# Patient Record
(demographics unavailable — no encounter records)

---

## 2024-10-07 NOTE — PHYSICAL EXAM
[de-identified] : slight discomfort to palpation of right TMJ with opening [Midline] : trachea located in midline position [de-identified] : lkeft sided malocclusion [Normal] : no neck adenopathy

## 2024-10-07 NOTE — ASSESSMENT
[FreeTextEntry1] : I have recommended TMJ Precautions.  Consider soft diet, warm packs, massage and OTC NSAIDs if not contraindicated for TMJ pain.  Seek Dental/OMFS/TMJ Specialist consultation for continued problem.  Seek attention for otalgia, otorrhea, bleeding, headache, hearing loss, dizziness or vertigo.  F/u PRN

## 2025-01-13 NOTE — ADDENDUM
[FreeTextEntry1] : This note was written by Yanni Colunga on 01/13/2025 acting as medical scribe for Dr. Katelin Moses. I, Dr. Katelin Moses, have read and attest that all the information, medical decision making and discharge instructions within are true and accurate.

## 2025-01-13 NOTE — HEALTH RISK ASSESSMENT
[0] : 2) Feeling down, depressed, or hopeless: Not at all (0) [PHQ-2 Negative - No further assessment needed] : PHQ-2 Negative - No further assessment needed [Never] : Never [[id number]] : 0

## 2025-01-13 NOTE — HISTORY OF PRESENT ILLNESS
[Post-hospitalization from ___ Hospital] : Post-hospitalization from [unfilled] Hospital [Admitted on: ___] : The patient was admitted on [unfilled] [Discharged on ___] : discharged on [unfilled] [Discharge Summary] : discharge summary [Pertinent Labs] : pertinent labs [Radiology Findings] : radiology findings [Discharge Med List] : discharge medication list [Med Reconciliation] : medication reconciliation has been completed [FreeTextEntry2] : Ms. ARANDA is a 38 year old F with PMHx of anxiety, asthma, PCOS presenting for hospital f/u. Per pt, she had an abscess on her ovaries and fallopian tubes and was started on IV antibiotics and had a blot clot in her pelvis. Pt was started on lovenox and was told to take 3 advils and alternate with tylenol. Pt is still on metronidazole. Pt is requesting a heme referral since she did not like the doctor in the hospital. Denies chest pain, sob, sharma, dizziness, diaphoresis, palpitations, LE swelling, orthopnea, syncope, n/v, headache.

## 2025-01-15 NOTE — REASON FOR VISIT
[FreeTextEntry2] : gonadal vein thrombus [FreeTextEntry1] : 1/16/2025  39 y/o F pleasant patient who presents for evaluation. 6 days in the hospital had pelvic pains, was told there is an abcess in Fallopian tubes and ovaries, both sides.  Given IV antibiotics, 6 days in the hospital.  Now on Flagyl.  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  Was given heparin gtt, and then transitioned to lovenox 90 BID.  No family history of DVT  No personal history of DVT SHe is on Depot provera - that was started 4 days ago.  She was discharged Sat.  Prior to hopsital stay, in good health

## 2025-01-15 NOTE — ASSESSMENT
[FreeTextEntry1] : Assessment: 1.  R gonadal vein thrombus 2.  GYN abcess  Plan 1.  To see GYN for followup of infection/ abcess 2.  For now, continue lovenox as planned.  She will see hematology for followup next week.  Hopefully she can be transitioned to eliquis or xarelto at that time.  Her APLS titers were negative. 3.  RTC in 8 weeks 4.  Plan to repeat CT venogram a/p in 3 months.  If everything resolved, then will take off or reduce dose of the a/c  aunt joined by phone who is a pediatric nurse mother was present during the visit we reviewed her CT scans and labs together.

## 2025-01-24 NOTE — HISTORY OF PRESENT ILLNESS
[FreeTextEntry1] : 38 year old presents for a follow up. Pt presented to the ED on 1/5 with abdominal pain and fever. Pt admitted on 1/5 for inpatient antibiotics for bilateral tubo-ovarian abscesses. Today, she reports occasional sharp lower abdominal pain. Pt has completed antibiotics. She began depo provera (1/11/25).   On 1/5, pt had pelvic tenderness and CMT with physiologic discharge. CTAP (1/5) demonstrated complex cystic adnexal lesions bilaterally possibly TOA or pyosalpinx. TVUS (1/5) demonstrated bilateral hydrosalpinx, left cystic mass 5.9x4.2x4cm and R structure 5.6x5.4x5.3 and adjacent L adnexal mass 3.4x2.7x2.9. Patient was started on IV ceftriaxone, IV doxycycline, and IV flagyl (on 1/5).    On HD#1-2, Interventional radiology consulted for possible drainage of abscesses however recommended no acute intervention, CTM on IV abx and holding NSAIDs/AC. Patient also noted to have elevated LFTs for which GI was consulted. Tylenol held.  On HD#3, IR re-reviewed the case and deemed the collections to be too small for drainage. Pt started on DVT chemoprophylaxis and NSAIDs for pain control. On HD#4, patient still with intermittent fevers, prompting additional fever workup with LE dopplers (negative for DVT) and CT abdomen and pelvis that was ordered, not yet performed. On HD#5, CTAP revealed a gonadal vein thrombosis for which a Heparin infusion was started. Hematology consulted for further anticoagulation recs. IV antibiotics for TOA continued. On HD#6, Heparin drip transitioned to therapeutic Lovenox, per Hematology team. IV antibiotics transitioned to PO, per ID team. On day of discharge on HD#7, patient meeting criteria for discharge to home: afebrile on PO antibiotics, tolerating regular diet, voiding, pain well controlled.   Pelvic sono (01/17/25): Uterus: 7.9 cm x 4.4 cm x 4.8 cm. Within normal limits. Endometrium: 5 mm. Within normal limits.  Right ovary: 5.6 cm x 3.1 cm x 4.4 cm. multiple hypoechoic structures with low level internal echoes measuring up to 4.6 cm and also 1.9 cm. Left ovary: 4.7 cm x 3.0 cm x 4.3 cm. 3.9 cm simple left ovarian cyst.  Fluid: None.  IMPRESSION: Bilateral tubo-ovarian abscesses and bilateral hydrosalpinges are no longer identified. Multiple hypoechoic structures in the right ovary with low-level internal echoes suggestive of endometriomas. 3.9 cm simple left ovarian cyst. Follow-up MRI can be performed.

## 2025-01-24 NOTE — ASSESSMENT
[FreeTextEntry1] : 37yo G0 (LMP: 12/19) p/w abdominal pain and fever in the ED admitted with concern for bilateral tubo-ovarian abscesses. WBC elevated 20.34. CTAP showed complex cystic adnexal lesions B/L possibly TOA or pyosalpinx. TVUS showing b/l hydrosalpinx, L cystic mass 5.9x4.2x4cm and R structure 5.6x5.4x5.3 and adjacent L adnexal mass 3.4x2.7x2.9. Admitted to Gyn for inpatient management. Hematology called overnight for a gonadal vein thrombosis found on repeat CT scan. If this is not related to an underlying malignancy, this may be considered provoked in setting of infection. 1/9/25- Lupus anticoagulant positive. Anticardiolipin antibody and B2G negative.  EXAM: [id number] - US PELVIC COMPLETE -  PROCEDURE DATE: 01/17/2025 FINDINGS: Uterus: 7.9 cm x 4.4 cm x 4.8 cm. Within normal limits. Endometrium: 5 mm. Within normal limits. Right ovary: 5.6 cm x 3.1 cm x 4.4 cm. multiple hypoechoic structures with low level internal echoes measuring up to 4.6 cm and also 1.9 cm. Left ovary: 4.7 cm x 3.0 cm x 4.3 cm. 3.9 cm simple left ovarian cyst. Fluid: None. IMPRESSION: Bilateral tubo-ovarian abscesses and bilateral hydrosalpinges are no longer identified. Multiple hypoechoic structures in the right ovary with low-level internal echoes suggestive of endometriomas. 3.9 cm simple left ovarian cyst. Follow-up MRI can be performed. Patient was evaluated by OBGYN and surveillance was recommended in 6-8 weeks.  Lovenox 90 mg q 12 hrs. Patient is requesting transition to oral AC.  Plan to start Coumadin 3 mg daily. Referred to Coumadin clinic.   Greater than 50% of the encounter time was spent on counseling and coordination of care for gonadal thrombosis/ lupus anticoagulant    and I have spent 60   minutes of face-to-face time with the patient.  RTC 3 months.

## 2025-01-24 NOTE — END OF VISIT
[FreeTextEntry3] : I, Cecy Simmons, acted as a scribe on behalf of Dr. Nessa Morgan M.D. on 01/23/2025.   All medical entries made by the scribe were at my, Dr. Nessa Morgan M.D., direction and personally dictated by me on 01/23/2025. I have reviewed the chart and agree that the record accurately reflects my personal performance of the history, physical exam, assessment and plan. I have also personally directed, reviewed, and agreed with the chart.

## 2025-01-24 NOTE — HISTORY OF PRESENT ILLNESS
[0 - No Distress] : Distress Level: 0 [90: Able to carry normal activity; minor signs or symptoms of disease.] : 90: Able to carry normal activity; minor signs or symptoms of disease.  [de-identified] : 39yo G0 (LMP: 12/19) p/w abdominal pain and fever in the ED admitted with concern for bilateral tubo-ovarian abscesses. WBC elevated 20.34. CTAP showed complex cystic adnexal lesions B/L possibly TOA or pyosalpinx. TVUS showing b/l hydrosalpinx, L cystic mass 5.9x4.2x4cm and R structure 5.6x5.4x5.3 and adjacent L adnexal mass 3.4x2.7x2.9. Admitted to Gyn for inpatient management. Hematology called overnight for a gonadal vein thrombosis found on repeat CT scan. If this is not related to an underlying malignancy, this may be considered provoked in setting of infection. 1/9/25- Lupus anticoagulant positive. Anticardiolipin antibody and B2G negative.  EXAM: [id number] - US PELVIC COMPLETE -  PROCEDURE DATE: 01/17/2025  FINDINGS: Uterus: 7.9 cm x 4.4 cm x 4.8 cm. Within normal limits. Endometrium: 5 mm. Within normal limits. Right ovary: 5.6 cm x 3.1 cm x 4.4 cm. multiple hypoechoic structures with low level internal echoes measuring up to 4.6 cm and also 1.9 cm. Left ovary: 4.7 cm x 3.0 cm x 4.3 cm. 3.9 cm simple left ovarian cyst. Fluid: None. IMPRESSION: Bilateral tubo-ovarian abscesses and bilateral hydrosalpinges are no longer identified. Multiple hypoechoic structures in the right ovary with low-level internal echoes suggestive of endometriomas. 3.9 cm simple left ovarian cyst. Follow-up MRI can be performed. Patient was evaluated by OBGYN and surveillance was recommended in 6-8 weeks.  Lovenox 90 mg q 12 hrs. Patient is requesting transition to oral AC.  Denies bleeding, denies melena or hematochezia.  Denies history of blood clots in her family.

## 2025-01-24 NOTE — PLAN
[FreeTextEntry1] : 38 year old for a follow up.  -rx depo -rto 6-8 weeks -repeat pelvic sono 6-8 weeks

## 2025-01-24 NOTE — REASON FOR VISIT
[Initial Consultation] : an initial consultation for [Family Member] : family member [FreeTextEntry2] : Gonadal thrombosis

## 2025-02-04 NOTE — ADDENDUM
[FreeTextEntry1] : This note was written by Erin Mariano on 02/04/2025 acting as medical scribe for Dr. Katelin Moses. I, Dr. Katelin Moses, have read and attest that all the information, medical decision making and discharge instructions within are true and accurate.

## 2025-02-04 NOTE — HEALTH RISK ASSESSMENT
[0] : 2) Feeling down, depressed, or hopeless: Not at all (0) [PHQ-2 Negative - No further assessment needed] : PHQ-2 Negative - No further assessment needed [Never] : Never [[id number]] : 0

## 2025-02-04 NOTE — HISTORY OF PRESENT ILLNESS
[FreeTextEntry1] : f/u [de-identified] : Ms. ARANDA is a 38 year old F with PMHx of anxiety, asthma, PCOS, DVT on lveonx being transited to [facility] presenting for f/u neck pain b/l left>R for months no trauma or falls no dysphagia  Denies chest pain, SOB, BRIDGES, dizziness, diaphoresis, palpitations, LE swelling, orthopnea, syncope, n/v, headache.

## 2025-03-12 NOTE — ASSESSMENT
Patient continues on Betaseron  She denies any new, focal symptoms at this time  Many of her ongoing symptoms are generalized and also likely related to other medical issues  She c/o fatigue and weaned off her gabapentin and Cymbalta because of this, but also does not use her CPAP  We discussed how untreated SELENE can cause fatigue, as well as other negative health implications such as increased risk of stroke, MI, arrhythmias  She c/o ambulatory dysfunction but currently has significant pain in the right hip--being managed by PCP currently  She was supposed to have updated cord imaging but had to reschedule twice, including cancelling her appts yesterday because she realized she did not have the Diazepam that had been prescribed for her imaging   --will reschedule MRI c-spine and t-spine    Reminded patient that Betaseron is to prevent new disease, but will not improve her already known symptoms  We again discussed PT and OT (was referred last time), and the importance of therapies to improve strength and endurance  I encouraged a regular exercise routine, working on weight loss  Labs just completed 6/29/22, normal LFTs  Vitamin D is low, has not been taking her supplements  Encouraged her to re-start  Her exam is stable today, although right hip pain limited my testing of her RLE strength  She will return in 4 months or sooner if needed  [FreeTextEntry1] : Assessment: 1.  R gonadal vein thrombus 2.  GYN abscess - resolved  Plan 1.  R gonadal vein thrombus Repeat CT venogram abdomen/pelvis next month. Continue GYN follow-up. Continue Eliquis 5 mg BID. Appreciate Heme. Last APLS labs negative. 2. Health Maintenance: Healthy diet and exercise. 3. History of GYN abscess: Appreciate GYN. 4. Follow-up in 3 months. Call with any questions. Office will call with test results.

## 2025-03-12 NOTE — REASON FOR VISIT
[Follow-Up - Clinic] : a clinic follow-up of [FreeTextEntry2] : gonadal vein thrombus [FreeTextEntry1] : 3/12/2025  Since last visit our shayne patient reports is transition to Eliquis 5 mg BID last week.   Seeing [facility] Dr. Donato Delgado  1/2025 Patient called and noted she saw [facility]. Reports +APLS labs with [facility]. She is being transition to Coumadin from Lovenox by [facility].  1/16/2025  37 y/o F shayne patient who presents for evaluation. 6 days in the hospital had pelvic pains, was told there is an abcess in Fallopian tubes and ovaries, both sides.  Given IV antibiotics, 6 days in the hospital.  Now on Flagyl.  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  Was given heparin gtt, and then transitioned to lovenox 90 BID.  No family history of DVT  No personal history of DVT SHe is on Depot provera - that was started 4 days ago.  She was discharged Sat.  Prior to hopsital stay, in good health

## 2025-03-12 NOTE — ASSESSMENT
[FreeTextEntry1] : Assessment: 1.  R gonadal vein thrombus 2.  GYN abscess - resolved  Plan 1.  R gonadal vein thrombus Repeat CT venogram abdomen/pelvis next month. Continue GYN follow-up. Continue Eliquis 5 mg BID. Appreciate Heme. Last APLS labs negative. 2. Health Maintenance: Healthy diet and exercise. 3. History of GYN abscess: Appreciate GYN. 4. Follow-up in 3 months. Call with any questions. Office will call with test results.

## 2025-03-12 NOTE — REASON FOR VISIT
[Follow-Up - Clinic] : a clinic follow-up of [FreeTextEntry2] : gonadal vein thrombus [FreeTextEntry1] : 3/12/2025  Since last visit our shayne patient reports is transition to Eliquis 5 mg BID last week.   Seeing [facility] Dr. Donato Delgado  1/2025 Patient called and noted she saw [facility]. Reports +APLS labs with [facility]. She is being transition to Coumadin from Lovenox by [facility].  1/16/2025  37 y/o F shayne patient who presents for evaluation. 6 days in the hospital had pelvic pains, was told there is an abcess in Fallopian tubes and ovaries, both sides.  Given IV antibiotics, 6 days in the hospital.  Now on Flagyl.  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  Was given heparin gtt, and then transitioned to lovenox 90 BID.  No family history of DVT  No personal history of DVT SHe is on Depot provera - that was started 4 days ago.  She was discharged Sat.  Prior to hopsital stay, in good health

## 2025-03-12 NOTE — PHYSICAL EXAM
[General Appearance - Well Developed] : well developed [Normal Appearance] : normal appearance [Well Groomed] : well groomed [General Appearance - Well Nourished] : well nourished [No Deformities] : no deformities [General Appearance - In No Acute Distress] : no acute distress [Normal Conjunctiva] : the conjunctiva exhibited no abnormalities [Eyelids - No Xanthelasma] : the eyelids demonstrated no xanthelasmas [Normal Oral Mucosa] : normal oral mucosa [No Oral Pallor] : no oral pallor [No Oral Cyanosis] : no oral cyanosis [Normal Jugular Venous A Waves Present] : normal jugular venous A waves present [Normal Jugular Venous V Waves Present] : normal jugular venous V waves present [No Jugular Venous Reyes A Waves] : no jugular venous reyes A waves [Heart Rate And Rhythm] : heart rate and rhythm were normal [Heart Sounds] : normal S1 and S2 [Murmurs] : no murmurs present [Nail Clubbing] : no clubbing of the fingernails [Cyanosis, Localized] : no localized cyanosis [Petechial Hemorrhages (___cm)] : no petechial hemorrhages [Respiration, Rhythm And Depth] : normal respiratory rhythm and effort [Exaggerated Use Of Accessory Muscles For Inspiration] : no accessory muscle use [Auscultation Breath Sounds / Voice Sounds] : lungs were clear to auscultation bilaterally [Abdomen Soft] : soft [Abdomen Tenderness] : non-tender [Abdomen Mass (___ Cm)] : no abdominal mass palpated [Abnormal Walk] : normal gait [Gait - Sufficient For Exercise Testing] : the gait was sufficient for exercise testing [Skin Color & Pigmentation] : normal skin color and pigmentation [] : no rash [No Venous Stasis] : no venous stasis [Skin Lesions] : no skin lesions [No Skin Ulcers] : no skin ulcer [No Xanthoma] : no  xanthoma was observed [Oriented To Time, Place, And Person] : oriented to person, place, and time [Affect] : the affect was normal [Mood] : the mood was normal [No Anxiety] : not feeling anxious

## 2025-04-02 NOTE — HISTORY OF PRESENT ILLNESS
[FreeTextEntry1] : 38 year old presents for a follow up.  Pt presented to the ED on 1/5 with abdominal pain and fever. She was admitted on 1/5 for inpatient antibiotics for bilateral tubo-ovarian abscesses.   Pt started Depo Provera on 1/11. She reports VB, and she has gotten 2 periods since starting depo. She states that her period in February was heavier than expected but it was lighter in March. Pt recently discontinued Lovenox and began Warfarin.   Pelvic sono (1/17/25): Uterus: 7.9 cm x 4.4 cm x 4.8 cm. Within normal limits. Endometrium: 5 mm. Within normal limits.  Right ovary: 5.6 cm x 3.1 cm x 4.4 cm. multiple hypoechoic structures with low level internal echoes measuring up to 4.6 cm and also 1.9 cm. Left ovary: 4.7 cm x 3.0 cm x 4.3 cm. 3.9 cm simple left ovarian cyst.  Fluid: None.  IMPRESSION: Bilateral tubo-ovarian abscesses and bilateral hydrosalpinges are no longer identified. Multiple hypoechoic structures in the right ovary with low-level internal echoes suggestive of endometriomas. 3.9 cm simple left ovarian cyst. Follow-up MRI can be performed.

## 2025-04-02 NOTE — END OF VISIT
[FreeTextEntry3] : I, Cecy Simmons, acted as a scribe on behalf of Dr. Nessa Morgan M.D. on 04/02/2025.   All medical entries made by the scribe were at my, Dr. Nessa Morgan M.D., direction and personally dictated by me on 04/02/2025. I have reviewed the chart and agree that the record accurately reflects my personal performance of the history, physical exam, assessment and plan. I have also personally directed, reviewed, and agreed with the chart.

## 2025-04-02 NOTE — PLAN
[FreeTextEntry1] : 38 year old for a follow up.  -Depo injection given today -Rx pelvic sono -F/u 6 months -RTO every 12 weeks for depo injection

## 2025-04-27 NOTE — HEALTH RISK ASSESSMENT
[0] : 2) Feeling down, depressed, or hopeless: Not at all (0) [PHQ-2 Negative - No further assessment needed] : PHQ-2 Negative - No further assessment needed [Never] : Never [[id number]] : 0

## 2025-04-27 NOTE — HISTORY OF PRESENT ILLNESS
[FreeTextEntry1] : 3 month f/u [de-identified] : Ms. ARANDA is a 38 year old F with PMHx of anxiety, asthma, PCOS, DVT on Lovenox being transited to coumadin presenting for 3 month f/u. Repeat CT abd 4/2025 showed resolved right ovarian abscess, resolved left and significant improvement in right tubo-ovarian abscess, resolved surrounding fluid and inflammatory infiltration. Pt is pending an MRI. Pt saw outside 2nd  opinion heme Dr. Sewell who tested her for lupus which was neg, so she was switched from coumadin to eliquis. Also saw vac Dr Rivera who rec c/w eliqus. Denies chest pain, sob, sharma, dizziness, diaphoresis, palpitations, LE swelling, orthopnea, syncope, n/v, headache.

## 2025-04-27 NOTE — HEALTH RISK ASSESSMENT
[0] : 2) Feeling down, depressed, or hopeless: Not at all (0) [PHQ-2 Negative - No further assessment needed] : PHQ-2 Negative - No further assessment needed [Never] : Never [[id number]] : 0

## 2025-04-27 NOTE — HISTORY OF PRESENT ILLNESS
[FreeTextEntry1] : 3 month f/u [de-identified] : Ms. ARANDA is a 38 year old F with PMHx of anxiety, asthma, PCOS, DVT on Lovenox being transited to coumadin presenting for 3 month f/u. Repeat CT abd 4/2025 showed resolved right ovarian abscess, resolved left and significant improvement in right tubo-ovarian abscess, resolved surrounding fluid and inflammatory infiltration. Pt is pending an MRI. Pt saw outside 2nd  opinion heme Dr. Sewell who tested her for lupus which was neg, so she was switched from coumadin to eliquis. Also saw vac Dr Rivera who rec c/w eliqus. Denies chest pain, sob, sharma, dizziness, diaphoresis, palpitations, LE swelling, orthopnea, syncope, n/v, headache.

## 2025-04-27 NOTE — HEALTH RISK ASSESSMENT
[0] : 2) Feeling down, depressed, or hopeless: Not at all (0) [PHQ-2 Negative - No further assessment needed] : PHQ-2 Negative - No further assessment needed [Never] : Never [[id number]] : 0

## 2025-04-27 NOTE — HISTORY OF PRESENT ILLNESS
[FreeTextEntry1] : 3 month f/u [de-identified] : Ms. ARANDA is a 38 year old F with PMHx of anxiety, asthma, PCOS, DVT on Lovenox being transited to coumadin presenting for 3 month f/u. Repeat CT abd 4/2025 showed resolved right ovarian abscess, resolved left and significant improvement in right tubo-ovarian abscess, resolved surrounding fluid and inflammatory infiltration. Pt is pending an MRI. Pt saw outside 2nd  opinion heme Dr. Sewell who tested her for lupus which was neg, so she was switched from coumadin to eliquis. Also saw vac Dr Rivera who rec c/w eliqus. Denies chest pain, sob, sharma, dizziness, diaphoresis, palpitations, LE swelling, orthopnea, syncope, n/v, headache.

## 2025-04-27 NOTE — ADDENDUM
[FreeTextEntry1] : This note was written by Yanni Colunga on 04/25/2025 acting as medical scribe for Dr. Katelin Moses. I, Dr. Katelin Moses, have read and attest that all the information, medical decision making and discharge instructions within are true and accurate.

## 2025-06-02 NOTE — REASON FOR VISIT
[Initial Consultation] : an initial consultation for [FreeTextEntry2] : neck pain, right ear ringing  Minimal inflammatory changes throughout the paranasal sinuses and their respective drainage pathways.  Mild S-shaped nasal septal deviation. Sinonasal anatomic variations, detailed above.ear ringing.

## 2025-06-02 NOTE — HISTORY OF PRESENT ILLNESS
[de-identified] : 40y/o female with neck pain that started in November and right ear ringing that started Saturday. She has no difficulty swallowing. She had a recent US of the neck that was normal. Pt denies any change in voice, dysphagia to solids or liquids or globus sensation. She states she has reflux every now and then. She is taking Pepcid or tums as needed. Pt denies any ear pain, drainage, recurrent ear infections or hearing loss. No smoking history

## 2025-06-02 NOTE — ASSESSMENT
[FreeTextEntry1] : 40y/o female w/ hx of reflux with neck pain that started in November and right intermittent ear ringing that started Saturday - Ear exam normal - ringing has resolved but if recurs she will return for eval and plan for audio - Laryngoscopy shows some reflux changes  - Neck exam normal and us thyroid/parathyroid without lesions -Tonsil stones noted bilateral -Recommend trying hydrogen peroxide mouth washes or water pick -Continue famotidine or tums as needed for reflux -Reflux handout for lifestyle modifications given  - also discussed musculoskeletal etiologies for neck pain; currently asymptomatic so advised to return if symptoms recur  Yes

## 2025-06-02 NOTE — PHYSICAL EXAM
[Normal] : mucosa is normal [Midline] : trachea located in midline position [de-identified] : tosil stones bialteral

## 2025-06-02 NOTE — PROCEDURE
[de-identified] : Reason for laryngoscopy: Oral exam unable to account for symptoms.   Flexible scope #2 used. Passed through nasal passage and nasopharynx/oropharynx/hypopharynx clear. Supraglottis normal. Glottis with fully mobile vocal cords without lesions or masses. Visualized subglottis clear. Postcricoid area with mild erythema or edema. No pooling of secretions. Mild reflux changes noted

## 2025-06-02 NOTE — END OF VISIT
[FreeTextEntry3] : I personally saw and examined Ms. KEON ARANDA in detail this visit today. I personally reviewed the HPI, PMH, FH, SH, ROS and medications/allergies. I have spoken to SOLANGE Crouch regarding the history and have personally determined the assessment and plan of care, and documented this myself. I was present and participated in all key portions of the encounter and all procedures noted above. I have made changes in the body of the note where appropriate.   Attesting Faculty: See Attending Signature Below

## 2025-06-13 NOTE — REASON FOR VISIT
[Follow-Up - Clinic] : a clinic follow-up of [FreeTextEntry2] : gonadal vein thrombus [FreeTextEntry1] : 6/13/2025  Since last visit our shayne patient reports chest, breathing and legs feel well. No bleeding issues on Eliquis.   CT Venogram A/P 4/2025: 1. Resolved left and significant improvement in right tubo-ovarian abscess. Resolved surrounding fluid and inflammatory infiltration. 2. No evidence of gonadal vein thrombus. 3. Sigmoid diverticulosis.  Took Depro-Provera at the beginning of April.   Medications: Eliquis 5 mg BID MVI   3/12/2025  Since last visit our shayne patient reports is transition to Eliquis 5 mg BID last week.   Seeing [facility] Dr. Donato Delgado  1/2025 Patient called and noted she saw [facility]. Reports +APLS labs with [facility]. She is being transition to Coumadin from Lovenox by [facility].  1/16/2025  37 y/o F shayne patient who presents for evaluation. 6 days in the hospital had pelvic pains, was told there is an abcess in Fallopian tubes and ovaries, both sides.  Given IV antibiotics, 6 days in the hospital.  Now on Flagyl.  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  Was given heparin gtt, and then transitioned to lovenox 90 BID.  No family history of DVT  No personal history of DVT SHe is on Depot provera - that was started 4 days ago.  She was discharged Sat.  Prior to hopsital stay, in good health

## 2025-06-13 NOTE — ASSESSMENT
[FreeTextEntry1] : Assessment: 1.  R gonadal vein thrombus - resolved 2.  GYN abscess - resolved  Plan 1.  R gonadal vein thrombus Repeat CT venogram abdomen/pelvis in April showed resolution.  No evidence of gonadal vein thrombus. Continue GYN follow-up. Continue Eliquis 5 mg BID until July 13th. Then can discontinue Eliquis from our perspective. Also can follow-up with Heme, appointment in July.  Appreciate Heme. Last APLS labs negative. Factor V Leiden and Prothrombin Gene Mutation negative. 2. Health Maintenance: Healthy diet and exercise. 3. History of GYN abscess: Resolved. Appreciate GYN. 4. If Pregnancy is desired or becomes pregnant: Will need Lovenox 40 mg daily. 5. Flight Prophylaxis: Eliquis 2.5 mg one tab one hour prior to flight or drive 4 hours or more. 6. Follow-up in 12 months. Call with any questions.     I, Dr. Daryn Rivera, personally performed the evaluation and management (E/M) services for this established patient who presents today.  That E/M includes conducting the examination, assessing all new/exacerbated conditions, and establishing a new plan of care.  Today, my ACP, Arley Silva, was here to observe my evaluation and management services for this new problem/exacerbated condition to be followed going forward.

## 2025-06-13 NOTE — PHYSICAL EXAM
[General Appearance - Well Developed] : well developed [Normal Appearance] : normal appearance [Well Groomed] : well groomed [General Appearance - Well Nourished] : well nourished [No Deformities] : no deformities [General Appearance - In No Acute Distress] : no acute distress [Normal Conjunctiva] : the conjunctiva exhibited no abnormalities [Eyelids - No Xanthelasma] : the eyelids demonstrated no xanthelasmas [Normal Oral Mucosa] : normal oral mucosa [No Oral Pallor] : no oral pallor [No Oral Cyanosis] : no oral cyanosis [Normal Jugular Venous A Waves Present] : normal jugular venous A waves present [Normal Jugular Venous V Waves Present] : normal jugular venous V waves present [No Jugular Venous Reyes A Waves] : no jugular venous reyes A waves [Respiration, Rhythm And Depth] : normal respiratory rhythm and effort [Exaggerated Use Of Accessory Muscles For Inspiration] : no accessory muscle use [Auscultation Breath Sounds / Voice Sounds] : lungs were clear to auscultation bilaterally [Heart Rate And Rhythm] : heart rate and rhythm were normal [Heart Sounds] : normal S1 and S2 [Murmurs] : no murmurs present [Abdomen Soft] : soft [Abdomen Tenderness] : non-tender [Abdomen Mass (___ Cm)] : no abdominal mass palpated [Abnormal Walk] : normal gait [Gait - Sufficient For Exercise Testing] : the gait was sufficient for exercise testing [Nail Clubbing] : no clubbing of the fingernails [Cyanosis, Localized] : no localized cyanosis [Petechial Hemorrhages (___cm)] : no petechial hemorrhages [Skin Color & Pigmentation] : normal skin color and pigmentation [No Venous Stasis] : no venous stasis [] : no rash [Skin Lesions] : no skin lesions [No Skin Ulcers] : no skin ulcer [No Xanthoma] : no  xanthoma was observed [Oriented To Time, Place, And Person] : oriented to person, place, and time [Affect] : the affect was normal [Mood] : the mood was normal [No Anxiety] : not feeling anxious